# Patient Record
Sex: FEMALE | Race: WHITE | Employment: PART TIME | URBAN - METROPOLITAN AREA
[De-identification: names, ages, dates, MRNs, and addresses within clinical notes are randomized per-mention and may not be internally consistent; named-entity substitution may affect disease eponyms.]

---

## 2017-02-03 ENCOUNTER — HOSPITAL ENCOUNTER (OUTPATIENT)
Dept: MAMMOGRAPHY | Age: 52
Discharge: HOME | End: 2017-02-03
Attending: INTERNAL MEDICINE | Admitting: INTERNAL MEDICINE

## 2017-02-03 DIAGNOSIS — R92.8 OTHER ABNORMAL AND INCONCLUSIVE FINDINGS ON DIAGNOSTIC IMAGING OF BREAST: ICD-10-CM

## 2017-02-03 DIAGNOSIS — R92.8 ABNORMAL MAMMOGRAM, UNSPECIFIED: ICD-10-CM

## 2017-03-07 ENCOUNTER — OFFICE VISIT (OUTPATIENT)
Dept: DERMATOLOGY | Age: 52
End: 2017-03-07

## 2017-03-07 DIAGNOSIS — D22.9 MULTIPLE NEVI: ICD-10-CM

## 2017-03-07 DIAGNOSIS — D23.30 DERMAL NEVUS OF FACE: Primary | ICD-10-CM

## 2017-03-07 DIAGNOSIS — Z80.8 FAMILY HISTORY OF MELANOMA: ICD-10-CM

## 2017-03-07 PROCEDURE — 99213 OFFICE O/P EST LOW 20 MIN: CPT | Performed by: DERMATOLOGY

## 2018-03-12 ENCOUNTER — OFFICE VISIT (OUTPATIENT)
Dept: DERMATOLOGY | Age: 53
End: 2018-03-12

## 2018-03-12 DIAGNOSIS — D22.9 MULTIPLE NEVI: Primary | ICD-10-CM

## 2018-03-12 DIAGNOSIS — Z80.8 FAMILY HISTORY OF MALIGNANT MELANOMA: ICD-10-CM

## 2018-03-12 PROCEDURE — 99213 OFFICE O/P EST LOW 20 MIN: CPT | Performed by: DERMATOLOGY

## 2018-03-12 RX ORDER — CITALOPRAM 20 MG/1
20 TABLET ORAL DAILY
COMMUNITY

## 2018-08-17 ENCOUNTER — HOSPITAL ENCOUNTER (OUTPATIENT)
Dept: MAMMOGRAPHY | Age: 53
Discharge: HOME | End: 2018-08-17
Attending: INTERNAL MEDICINE | Admitting: INTERNAL MEDICINE

## 2018-08-17 DIAGNOSIS — Z12.39 BREAST CANCER SCREENING: ICD-10-CM

## 2019-03-25 ENCOUNTER — OFFICE VISIT (OUTPATIENT)
Dept: DERMATOLOGY | Age: 54
End: 2019-03-25
Payer: COMMERCIAL

## 2019-03-25 DIAGNOSIS — D22.9 MULTIPLE NEVI: Primary | ICD-10-CM

## 2019-03-25 DIAGNOSIS — Z80.8 FAMILY HISTORY OF MALIGNANT MELANOMA: ICD-10-CM

## 2019-03-25 PROCEDURE — 99213 OFFICE O/P EST LOW 20 MIN: CPT | Performed by: DERMATOLOGY

## 2019-03-25 RX ORDER — M-VIT,TX,IRON,MINS/CALC/FOLIC 27MG-0.4MG
1 TABLET ORAL DAILY
COMMUNITY

## 2019-12-02 ENCOUNTER — HOSPITAL ENCOUNTER (OUTPATIENT)
Dept: WOMENS IMAGING | Age: 54
Discharge: HOME | End: 2019-12-02
Payer: COMMERCIAL

## 2019-12-02 DIAGNOSIS — Z80.3 FAMILY HISTORY OF MALIGNANT NEOPLASM OF BREAST: ICD-10-CM

## 2019-12-02 DIAGNOSIS — Z12.31 ENCOUNTER FOR SCREENING MAMMOGRAM FOR BREAST CANCER: ICD-10-CM

## 2019-12-02 PROCEDURE — 77063 BREAST TOMOSYNTHESIS BI: CPT

## 2020-07-16 ENCOUNTER — TELEPHONE (OUTPATIENT)
Dept: DERMATOLOGY | Age: 55
End: 2020-07-16

## 2020-07-16 NOTE — TELEPHONE ENCOUNTER
Patient calling to schedule her yearly appointment that we had canceled due to covid-19.    [phone]

## 2020-07-30 ENCOUNTER — OFFICE VISIT (OUTPATIENT)
Dept: DERMATOLOGY | Age: 55
End: 2020-07-30
Payer: COMMERCIAL

## 2020-07-30 VITALS — TEMPERATURE: 97.9 F

## 2020-07-30 PROCEDURE — 99214 OFFICE O/P EST MOD 30 MIN: CPT | Performed by: DERMATOLOGY

## 2020-07-30 RX ORDER — ESTRADIOL 0.1 MG/G
CREAM VAGINAL
COMMUNITY
Start: 2020-07-20

## 2020-07-30 RX ORDER — HYDROCHLOROTHIAZIDE 12.5 MG/1
TABLET ORAL
COMMUNITY
Start: 2020-07-26

## 2020-07-30 RX ORDER — ALENDRONATE SODIUM 70 MG/1
70 TABLET ORAL
COMMUNITY
Start: 2019-10-03

## 2020-07-30 NOTE — PROGRESS NOTES
[facility]  Mamta Cates MD  [address]  1965    60 y.o. Date of Visit: 2020    Chief Complaint: moles  Chief Complaint   Patient presents with    Skin Exam     Last seen: 3-2019  *her sister is a patient - Latrell Gonsaloo - hx of recent melanoma    History of Present Illness:    1. Here for f/u for evaluation of multiple asx pigmented lesions on the trunk and extremities, present for many years; no change in size/shape/color of any lesions; no bleeding lesions. She has a stable lesion on the L upper cheek, present for many years. Unchanged since last seen. No bleeding, no scale, no enlargement. 2. Father and brother (multiple and has had genetic testing - reportedly negative) and more recently her niece (brother's daughter) with hx of MM (eye and back) and sister now too Nannette Fleming) - other fam members (2 other siblings) with no melanoma or pancreatic cancer. Her family has had the recommendation to have genetic testing done. 3. C/o intermittent flares of subtle dry mildly erythematous patches near the medial brow and chin. Improve with moisturizer and clear intermittently. Asx. No personal hx of skin CA. Lives near [facility] and [facility]. She has 3 children in their 20's. Review of Systems:  Gen: Feels well, good sense of health. Skin: No changing moles or lesions. Past Medical History, Family History, Surgical History, Medications and Allergies reviewed. History reviewed. No pertinent past medical history.     Past Surgical History:   Procedure Laterality Date     SECTION      HAND SURGERY      SPINE SURGERY         Outpatient Medications Marked as Taking for the 20 encounter (Office Visit) with Benny Rae MD   Medication Sig Dispense Refill    alendronate (FOSAMAX) 70 MG tablet Take 70 mg by mouth every 7 days      hydrochlorothiazide (HYDRODIURIL) 12.5 MG tablet TK 1 T PO D      estradiol (ESTRACE) 0.1 MG/GM vaginal cream U 1 GRAM VAGINALLY 3 TIMES WEEKLY HS      Multiple Vitamins-Minerals (THERAPEUTIC MULTIVITAMIN-MINERALS) tablet Take 1 tablet by mouth daily      citalopram (CELEXA) 20 MG tablet Take 20 mg by mouth daily      buPROPion (WELLBUTRIN XL) 150 MG XL tablet          Allergies   Allergen Reactions    Codeine     Pcn [Penicillins]          Physical Examination     Gen, NAD  The following were examined and determined to be normal: Psych/Neuro, Scalp/hair, Head/face, Conjunctivae/eyelids, Gums/teeth/lips, Neck, Breast/axilla/chest, Abdomen, Back, RUE, LUE, RLE, LLE, Nails/digits and buttocks. The following were examined and determined to be abnormal: none  L upper cheek well-defined ~4 mm skin-colored dome-shaped papule  trunk and extremities with scattered brown macules and papules    R medial instep with brown well-defined macule  Brows (medial) with minimal erythema; chin clear            Assessment and Plan     1. Benign-appearing nevi including dermal nevus on the cheek  2. Family hx of melanoma  - educ re ABCD's of MM   educ sun protection   encouraged skin check yearly (sooner if indicated), self checks   - briefly discussed genetic testing in the past but her brother was reportedly negative     3.  C/w seb derm - very mild  - HC 1-2.5% prn flares - brows/chin; ed se/misuse  - call if worsening or persistent

## 2021-05-07 ENCOUNTER — HOSPITAL ENCOUNTER (OUTPATIENT)
Dept: WOMENS IMAGING | Age: 56
Discharge: HOME | End: 2021-05-07
Payer: COMMERCIAL

## 2021-05-07 DIAGNOSIS — Z12.31 VISIT FOR SCREENING MAMMOGRAM: ICD-10-CM

## 2021-05-07 PROCEDURE — 77063 BREAST TOMOSYNTHESIS BI: CPT

## 2021-11-01 ENCOUNTER — OFFICE VISIT (OUTPATIENT)
Dept: DERMATOLOGY | Age: 56
End: 2021-11-01
Payer: COMMERCIAL

## 2021-11-01 VITALS — TEMPERATURE: 98.6 F

## 2021-11-01 DIAGNOSIS — Z80.8 FAMILY HISTORY OF MALIGNANT MELANOMA: ICD-10-CM

## 2021-11-01 DIAGNOSIS — B00.9 HSV INFECTION: ICD-10-CM

## 2021-11-01 DIAGNOSIS — L21.9 SEBORRHEIC DERMATITIS: ICD-10-CM

## 2021-11-01 DIAGNOSIS — D22.9 MULTIPLE NEVI: Primary | ICD-10-CM

## 2021-11-01 PROCEDURE — 99214 OFFICE O/P EST MOD 30 MIN: CPT | Performed by: DERMATOLOGY

## 2021-11-01 RX ORDER — VALACYCLOVIR HYDROCHLORIDE 1 G/1
TABLET, FILM COATED ORAL
Qty: 16 TABLET | Refills: 1 | Status: SHIPPED | OUTPATIENT
Start: 2021-11-01

## 2021-11-01 NOTE — PROGRESS NOTES
Refill    alendronate (FOSAMAX) 70 MG tablet Take 70 mg by mouth every 7 days      hydrochlorothiazide (HYDRODIURIL) 12.5 MG tablet TK 1 T PO D         Allergies   Allergen Reactions    Codeine     Pcn [Penicillins]          Physical Examination     Gen, NAD  FSE today    L upper cheek well-defined ~4 mm skin-colored dome-shaped papule  trunk and extremities with scattered brown macules and papules    R medial instep with brown well-defined macule - stable  Brows (medial) with minimal erythema; chin clear  Central lower lip with mild erythema near vermilion            Assessment and Plan     1. Benign-appearing nevi including dermal nevus on the cheek  2. Family hx of melanoma  - educ re ABCD's of MM   educ sun protection SPF 30+  encouraged skin check yearly (sooner if indicated), self checks   - briefly discussed genetic testing in the past but her brother was reportedly negative     3. C/w seb derm - very mild and well-controlled  - HC 1-2.5% prn flares - brows/chin; ed se/misuse  - call if worsening or persistent    4.  HSV flares - start valtrex prn flares - discussed usage

## 2022-07-01 ENCOUNTER — HOSPITAL ENCOUNTER (OUTPATIENT)
Dept: WOMENS IMAGING | Age: 57
Discharge: HOME | End: 2022-07-01
Payer: COMMERCIAL

## 2022-07-01 VITALS — WEIGHT: 130 LBS | HEIGHT: 66 IN | BODY MASS INDEX: 20.89 KG/M2

## 2022-07-01 DIAGNOSIS — Z12.31 BREAST CANCER SCREENING BY MAMMOGRAM: ICD-10-CM

## 2022-07-01 PROCEDURE — 77063 BREAST TOMOSYNTHESIS BI: CPT

## 2022-09-12 ENCOUNTER — OFFICE VISIT (OUTPATIENT)
Dept: DERMATOLOGY | Age: 57
End: 2022-09-12
Payer: COMMERCIAL

## 2022-09-12 DIAGNOSIS — Z80.8 FAMILY HISTORY OF MALIGNANT MELANOMA: ICD-10-CM

## 2022-09-12 DIAGNOSIS — B00.9 HSV INFECTION: ICD-10-CM

## 2022-09-12 DIAGNOSIS — D22.9 MULTIPLE NEVI: Primary | ICD-10-CM

## 2022-09-12 DIAGNOSIS — L21.9 SEBORRHEIC DERMATITIS: ICD-10-CM

## 2022-09-12 DIAGNOSIS — D48.5 NEOPLASM OF UNCERTAIN BEHAVIOR OF SKIN: ICD-10-CM

## 2022-09-12 PROCEDURE — 11102 TANGNTL BX SKIN SINGLE LES: CPT | Performed by: DERMATOLOGY

## 2022-09-12 PROCEDURE — 99214 OFFICE O/P EST MOD 30 MIN: CPT | Performed by: DERMATOLOGY

## 2022-09-12 NOTE — PROGRESS NOTES
[facility]  Selvin Walker MD  [address]  1965    59 y.o. Date of Visit: 2022    Chief Complaint: moles  Chief Complaint   Patient presents with    Skin Exam     Yearly skin exam     Last seen:  *her sister is a patient - Chela Son - hx of recent melanoma  *mom is in the hospital right now -     *new diagnosis of hyperthyroidism    History of Present Illness:    1. Here for f/u for evaluation of multiple asx pigmented lesions on the trunk and extremities, present for many years; no change in size/shape/color of any lesions; no bleeding lesions. She has a stable lesion on the L upper cheek, present for many years. Unchanged since last seen. No bleeding, no scale, no enlargement. 2. Father and brother (multiple and has had genetic testing - reportedly negative) and more recently her niece (brother's daughter) with hx of MM (eye and back) and sister now too Hai San) - other fam members (2 other siblings) with no melanoma or pancreatic cancer. Her family has had the recommendation to have genetic testing done. 3. F/u for intermittent flares of subtle dry mildly erythematous patches near the medial brow and chin. Improve with HC prn flares. Asx.     4. F/u HSV flares on the lower lip - started valtrex to use prn flares and this helps. But flaring very frequently over the summer. 5. She has a concerning lesion on the R upper outer arm. No personal hx of skin CA. Lives near [facility] and [facility]. She has 3 children in their 20's. Review of Systems:  Gen: Feels well, good sense of health. Skin: No changing moles or lesions. Past Medical History, Family History, Surgical History, Medications and Allergies reviewed. History reviewed. No pertinent past medical history.     Past Surgical History:   Procedure Laterality Date     SECTION      HAND SURGERY      SPINE SURGERY         Outpatient Medications Marked as Taking for the 9/12/22 encounter (Office Visit) with Florentin Charles MD   Medication Sig Dispense Refill    valACYclovir (VALTREX) 1 g tablet Take 2 tabs po at first signs of cold sore flare and then 2 tabs po 12 hours later. 16 tablet 1    alendronate (FOSAMAX) 70 MG tablet Take 70 mg by mouth every 7 days      hydrochlorothiazide (HYDRODIURIL) 12.5 MG tablet TK 1 T PO D      estradiol (ESTRACE) 0.1 MG/GM vaginal cream U 1 GRAM VAGINALLY 3 TIMES WEEKLY HS      Multiple Vitamins-Minerals (THERAPEUTIC MULTIVITAMIN-MINERALS) tablet Take 1 tablet by mouth daily      citalopram (CELEXA) 20 MG tablet Take 20 mg by mouth daily      escitalopram (LEXAPRO) 20 MG tablet       progesterone (PROMETRIUM) 200 MG capsule       VIVELLE-DOT 0.075 MG/24HR       buPROPion (WELLBUTRIN XL) 150 MG XL tablet          Allergies   Allergen Reactions    Codeine     Pcn [Penicillins]          Physical Examination     Gen, NAD  FSE today    L upper cheek well-defined ~4 mm skin-colored dome-shaped papule  trunk and extremities with scattered brown macules and papules    R medial instep with brown well-defined macule - stable  Brows (medial) with minimal erythema; chin clear  Lips clear  R upper outer arm with pink dry papule            Assessment and Plan     1. Benign-appearing nevi including dermal nevus on the cheek  2. Family hx of melanoma  - educ re ABCD's of MM   educ sun protection SPF 30+  encouraged skin check yearly (sooner if indicated), self checks   - briefly discussed genetic testing previously but her brother was reportedly negative     3. C/w seb derm - very mild and well-controlled  - HC 1-2.5% prn flares - brows/chin; ed se/misuse  - call if worsening or persistent    4.  HSV flares - chronic, intermittent - worsening frequency over the summer  - use valtrex prn flares - discussed usage  - will change to suppressive dosing 500 mg daily around THE [facility] Day timing to prevent summer flares    5. R upper outer arm - r/o ISK vs BCC  - Shave biopsy performed after verbal consent obtained. Patient educated regarding risk of bleeding, infection, scar and educated on wound care. Skin cleansed with alcohol pad and site anesthetized with lido + epi. Aluminum chloride applied to site for hemostasis. Petrolatum ointment and bandage applied. Specimen bottle labeled with patient information and site and specimen sent to [facility].

## 2022-09-12 NOTE — PATIENT INSTRUCTIONS

## 2022-09-14 LAB — DERMATOLOGY PATHOLOGY REPORT: NORMAL

## 2023-05-01 ENCOUNTER — PATIENT MESSAGE (OUTPATIENT)
Dept: DERMATOLOGY | Age: 58
End: 2023-05-01

## 2023-05-01 NOTE — TELEPHONE ENCOUNTER
From: Santosh Cerna  To: Dr. Janie Craig: 5/1/2023 11:34 AM EDT  Subject: new prescription for HSV flares    Hi Dr. Danette King,  At my last appointment - September 2022, you said I might benefit from taking a daily medication to suppress HSV flares that occur in the summer. I have already experienced a flare this weekend, with biking and yard work and was wondering if you could prescribe this medication for me now. My pharmacy would be [facility] [address].   Thank you,  Danna Eduardo

## 2023-05-02 RX ORDER — VALACYCLOVIR HYDROCHLORIDE 500 MG/1
TABLET, FILM COATED ORAL
Qty: 90 TABLET | Refills: 1 | Status: SHIPPED | OUTPATIENT
Start: 2023-05-02

## 2023-06-14 ENCOUNTER — PATIENT MESSAGE (OUTPATIENT)
Dept: DERMATOLOGY | Age: 58
End: 2023-06-14

## 2023-07-06 ENCOUNTER — HOSPITAL ENCOUNTER (OUTPATIENT)
Dept: WOMENS IMAGING | Age: 58
Discharge: HOME | End: 2023-07-06
Payer: COMMERCIAL

## 2023-07-06 DIAGNOSIS — Z12.31 VISIT FOR SCREENING MAMMOGRAM: ICD-10-CM

## 2023-07-06 PROCEDURE — 77063 BREAST TOMOSYNTHESIS BI: CPT

## 2023-08-01 ENCOUNTER — OFFICE VISIT (OUTPATIENT)
Dept: DERMATOLOGY | Age: 58
End: 2023-08-01
Payer: COMMERCIAL

## 2023-08-01 DIAGNOSIS — B00.9 HSV INFECTION: ICD-10-CM

## 2023-08-01 DIAGNOSIS — Z80.8 FAMILY HISTORY OF MALIGNANT MELANOMA: ICD-10-CM

## 2023-08-01 DIAGNOSIS — D22.9 MULTIPLE NEVI: ICD-10-CM

## 2023-08-01 DIAGNOSIS — D48.5 NEOPLASM OF UNCERTAIN BEHAVIOR OF SKIN: Primary | ICD-10-CM

## 2023-08-01 DIAGNOSIS — L21.9 SEBORRHEIC DERMATITIS: ICD-10-CM

## 2023-08-01 DIAGNOSIS — L81.4 LENTIGINES: ICD-10-CM

## 2023-08-01 PROCEDURE — 99214 OFFICE O/P EST MOD 30 MIN: CPT | Performed by: DERMATOLOGY

## 2023-08-01 PROCEDURE — 11102 TANGNTL BX SKIN SINGLE LES: CPT | Performed by: DERMATOLOGY

## 2023-08-01 RX ORDER — VALACYCLOVIR HYDROCHLORIDE 500 MG/1
TABLET, FILM COATED ORAL
Qty: 90 TABLET | Refills: 3 | Status: SHIPPED | OUTPATIENT
Start: 2023-08-01

## 2023-08-01 NOTE — PROGRESS NOTES
[facility]  Rafael Rodriguez MD  [address]  1965    59 y.o. Date of Visit: 2023    Chief Complaint: moles  Chief Complaint   Patient presents with    Skin Lesion     Last seen:   *her sister is a patient - Harvinder Olivera - hx of recent melanoma  *mom is in the hospital right now -     *new diagnosis of hyperthyroidism    History of Present Illness:    1. Here for new pigmented lesion on the L FA that she is concerned is changing. Asx.      2.  Here for f/u for evaluation of multiple asx pigmented lesions on the trunk and extremities, present for many years; no change in size/shape/color of any lesions; no bleeding lesions. She has a stable lesion on the L upper cheek, present for many years. Unchanged since last seen. No bleeding, no scale, no enlargement. 3. Multiple family members with hx of melanoma. Father and brother (multiple and has had genetic testing - reportedly negative) and more recently her niece (brother's daughter) with hx of MM (eye and back) and sister now too Kennieth Milder)  Other fam members (2 other siblings) - no melanoma or pancreatic cancer. Her family has had the recommendation to have genetic testing done. 3. F/u for intermittent flares of subtle dry mildly erythematous patches near the medial brow and chin. Improve with HC prn flares. Asx.     4. F/u HSV flares on the lower lip - on daily dose of valtrex - notes much better with this and only occasional very mild flares since. No personal hx of skin CA. Lives near [place] and [place]. She has 3 children in their 20's. Review of Systems:  Gen: Feels well, good sense of health. Skin: No changing moles or lesions. Past Medical History, Family History, Surgical History, Medications and Allergies reviewed. History reviewed. No pertinent past medical history.     Past Surgical History:   Procedure Laterality Date     SECTION      HAND SURGERY

## 2023-08-04 LAB — DERMATOLOGY PATHOLOGY REPORT: NORMAL

## 2024-09-06 ENCOUNTER — HOSPITAL ENCOUNTER (OUTPATIENT)
Dept: WOMENS IMAGING | Age: 59
Discharge: HOME | End: 2024-09-06
Payer: COMMERCIAL

## 2024-09-06 VITALS — WEIGHT: 130 LBS | BODY MASS INDEX: 20.89 KG/M2 | HEIGHT: 66 IN

## 2024-09-06 DIAGNOSIS — Z12.31 VISIT FOR SCREENING MAMMOGRAM: ICD-10-CM

## 2024-09-06 PROCEDURE — 77063 BREAST TOMOSYNTHESIS BI: CPT

## 2024-09-16 ENCOUNTER — OFFICE VISIT (OUTPATIENT)
Dept: DERMATOLOGY | Age: 59
End: 2024-09-16
Payer: COMMERCIAL

## 2024-09-16 DIAGNOSIS — L81.4 LENTIGINES: ICD-10-CM

## 2024-09-16 DIAGNOSIS — L21.9 SEBORRHEIC DERMATITIS: ICD-10-CM

## 2024-09-16 DIAGNOSIS — Z80.8 FAMILY HISTORY OF MALIGNANT MELANOMA: ICD-10-CM

## 2024-09-16 DIAGNOSIS — D22.9 MULTIPLE NEVI: Primary | ICD-10-CM

## 2024-09-16 DIAGNOSIS — B00.9 HSV INFECTION: ICD-10-CM

## 2024-09-16 PROCEDURE — 99213 OFFICE O/P EST LOW 20 MIN: CPT | Performed by: DERMATOLOGY

## 2024-09-16 RX ORDER — VALACYCLOVIR HYDROCHLORIDE 500 MG/1
TABLET, FILM COATED ORAL
Qty: 90 TABLET | Refills: 3 | Status: SHIPPED | OUTPATIENT
Start: 2024-09-16

## 2024-09-16 RX ORDER — DESVENLAFAXINE 50 MG/1
50 TABLET, FILM COATED, EXTENDED RELEASE ORAL DAILY
COMMUNITY

## 2024-10-22 RX ORDER — VALACYCLOVIR HYDROCHLORIDE 500 MG/1
TABLET, FILM COATED ORAL
Qty: 90 TABLET | Refills: 3 | OUTPATIENT
Start: 2024-10-22

## 2024-10-22 NOTE — TELEPHONE ENCOUNTER
Valacyclovir filled to [place] [id number] supply 9/2024 + 3 RF    Refill request from [facility]?     LVM to return call.